# Patient Record
Sex: MALE | Race: WHITE
[De-identification: names, ages, dates, MRNs, and addresses within clinical notes are randomized per-mention and may not be internally consistent; named-entity substitution may affect disease eponyms.]

---

## 2018-07-02 ENCOUNTER — HOSPITAL ENCOUNTER (EMERGENCY)
Dept: HOSPITAL 26 - MED | Age: 23
Discharge: HOME | End: 2018-07-02
Payer: SELF-PAY

## 2018-07-02 VITALS — HEIGHT: 70 IN | BODY MASS INDEX: 27.92 KG/M2 | WEIGHT: 195 LBS

## 2018-07-02 VITALS — DIASTOLIC BLOOD PRESSURE: 90 MMHG | SYSTOLIC BLOOD PRESSURE: 145 MMHG

## 2018-07-02 VITALS — DIASTOLIC BLOOD PRESSURE: 87 MMHG | SYSTOLIC BLOOD PRESSURE: 133 MMHG

## 2018-07-02 DIAGNOSIS — N45.1: Primary | ICD-10-CM

## 2018-07-02 NOTE — NUR
Patient discharged BY KEN HUYNH with v/s stable. Written and verbal after care 
instructions given and explained. Patient alert, oriented and verbalized 
understanding of instructions. Ambulatory with steady gait. All questions 
addressed prior to discharge. ID band removed. Patient advised to follow up 
with PMD. Rx of CIPROFLOXACIN, NAPROSYN given. Patient educated on indication 
of medication including possible reaction and side effects. Opportunity to ask 
questions provided and answered.

## 2018-07-02 NOTE — NUR
PATIENT PRESENTS TO ED WITH LWOER ABDOMINAL PAIN AND TESTICULAR PAIN X4 DAYS. 
PT DENIES N/V/D; SKIN IS PINK/WARM/DRY; AAOX4 WITH EVEN AND STEADY GAIT; LUNGS 
CLEAR BL; HR EVEN AND REGULAR; PT DENIES ANY FEVER, CP, SOB, OR COUGH AT THIS 
TIME; PATIENT STATES PAIN OF 4/10 AT THIS TIME; VSS; PATIENT POSITIONED FOR 
COMFORT; HOB ELEVATED; BEDRAILS UP X1; BED DOWN. ER MD MADE AWARE OF PT STATUS.